# Patient Record
Sex: FEMALE | URBAN - METROPOLITAN AREA
[De-identification: names, ages, dates, MRNs, and addresses within clinical notes are randomized per-mention and may not be internally consistent; named-entity substitution may affect disease eponyms.]

---

## 2017-01-26 ENCOUNTER — OUTPATIENT (OUTPATIENT)
Dept: OUTPATIENT SERVICES | Facility: HOSPITAL | Age: 52
LOS: 1 days | End: 2017-01-26
Payer: MEDICARE

## 2017-01-26 PROCEDURE — 71046 X-RAY EXAM CHEST 2 VIEWS: CPT

## 2017-01-26 PROCEDURE — 71020: CPT | Mod: 26

## 2018-03-30 PROBLEM — Z00.00 ENCOUNTER FOR PREVENTIVE HEALTH EXAMINATION: Status: ACTIVE | Noted: 2018-03-30

## 2018-04-05 ENCOUNTER — APPOINTMENT (OUTPATIENT)
Dept: NEUROLOGY | Facility: CLINIC | Age: 53
End: 2018-04-05
Payer: MEDICARE

## 2018-04-05 PROCEDURE — 95816 EEG AWAKE AND DROWSY: CPT

## 2021-03-02 ENCOUNTER — NON-APPOINTMENT (OUTPATIENT)
Age: 56
End: 2021-03-02

## 2021-03-30 ENCOUNTER — NON-APPOINTMENT (OUTPATIENT)
Age: 56
End: 2021-03-30

## 2021-04-19 ENCOUNTER — APPOINTMENT (OUTPATIENT)
Dept: BREAST CENTER | Facility: CLINIC | Age: 56
End: 2021-04-19
Payer: MEDICARE

## 2021-04-19 VITALS — OXYGEN SATURATION: 98 % | BODY MASS INDEX: 27.31 KG/M2 | WEIGHT: 160 LBS | HEIGHT: 64 IN | HEART RATE: 64 BPM

## 2021-04-19 DIAGNOSIS — Z80.0 FAMILY HISTORY OF MALIGNANT NEOPLASM OF DIGESTIVE ORGANS: ICD-10-CM

## 2021-04-19 DIAGNOSIS — Z78.9 OTHER SPECIFIED HEALTH STATUS: ICD-10-CM

## 2021-04-19 PROCEDURE — 99203 OFFICE O/P NEW LOW 30 MIN: CPT

## 2021-04-19 RX ORDER — BREXPIPRAZOLE 4 MG/1
4 TABLET ORAL
Qty: 30 | Refills: 0 | Status: ACTIVE | COMMUNITY
Start: 2021-04-05

## 2021-04-19 RX ORDER — LAMOTRIGINE 200 MG/1
200 TABLET ORAL
Qty: 60 | Refills: 0 | Status: ACTIVE | COMMUNITY
Start: 2021-03-20

## 2021-04-19 RX ORDER — HYDROXYZINE HYDROCHLORIDE 25 MG/1
25 TABLET ORAL
Qty: 30 | Refills: 0 | Status: ACTIVE | COMMUNITY
Start: 2021-03-20

## 2021-04-19 RX ORDER — ATORVASTATIN CALCIUM 10 MG/1
10 TABLET, FILM COATED ORAL
Qty: 30 | Refills: 0 | Status: ACTIVE | COMMUNITY
Start: 2021-03-02

## 2021-04-19 RX ORDER — MIRTAZAPINE 15 MG/1
15 TABLET, FILM COATED ORAL
Qty: 30 | Refills: 0 | Status: ACTIVE | COMMUNITY
Start: 2021-04-05

## 2021-04-19 RX ORDER — FAMOTIDINE 20 MG/1
20 TABLET, FILM COATED ORAL
Qty: 60 | Refills: 0 | Status: ACTIVE | COMMUNITY
Start: 2021-03-23

## 2021-04-19 RX ORDER — VENLAFAXINE HYDROCHLORIDE 150 MG/1
150 CAPSULE, EXTENDED RELEASE ORAL
Qty: 60 | Refills: 0 | Status: ACTIVE | COMMUNITY
Start: 2021-03-20

## 2021-04-19 RX ORDER — MIDODRINE HYDROCHLORIDE 5 MG/1
5 TABLET ORAL
Qty: 20 | Refills: 0 | Status: ACTIVE | COMMUNITY
Start: 2021-01-08

## 2021-04-19 RX ORDER — CLONAZEPAM 2 MG/1
2 TABLET ORAL
Qty: 60 | Refills: 0 | Status: ACTIVE | COMMUNITY
Start: 2021-03-30

## 2021-04-19 NOTE — PAST MEDICAL HISTORY
[Postmenopausal] : The patient is postmenopausal [Menarche Age ____] : age at menarche was [unfilled] [Menopause Age____] : age at menopause was [unfilled] [Approximately ___] : the LMP was approximately [unfilled] [Total Preg ___] : G[unfilled] [AB Spont ___] : miscarriages: [unfilled]  [History of Hormone Replacement Treatment] : has no history of hormone replacement treatment [FreeTextEntry5] : None [FreeTextEntry6] : None [FreeTextEntry7] : Yes for treatment of endometriosis when she was 18 [FreeTextEntry8] : None

## 2021-04-19 NOTE — HISTORY OF PRESENT ILLNESS
[FreeTextEntry1] : Cristal is a 56 year old female referred by Dr. De La Rosa (Breast Surgeon) who presents for initial evaluation regarding family history of breast cancer.  She has a hx of b/l benign breast excisions, one in her left breast over 20 years ago and one in her right breast in 2017; reports that her breast surgeon retired. Denies any breast pain or palpable breasts abnormalities today, denies nipple discharge. \par \par PURNIMA lifetime risk is 29.9%; Family hx of breast cancer: mother - 49 years old, m-aunt 52 years old, maternal female cousin - 34 years old\par \par Discussed patients high risk status and recommendations for close surveillance. Patient understands and would like to be followed closely.\par \par Reviewed the benefits of genetic testing in the setting of the patients family history, as well the implications of the various results; insurance coverage and pricing was also explained.\par \par Discussed benefits of surveillance and well as implication of the sensitivity of breast MRI. Patient understands and agrees to go forward with MRI for breast cancer surveillance.\par

## 2021-04-19 NOTE — DATA REVIEWED
[FreeTextEntry1] : 3/1/2021 ([facility]) bilateral mammogram/ultrasound showing heterogeneously dense tissue, no mammographic or sonographic evidence of malignancy in either breast. Benign finding BIRADS 2

## 2021-04-30 ENCOUNTER — NON-APPOINTMENT (OUTPATIENT)
Age: 56
End: 2021-04-30

## 2021-06-08 ENCOUNTER — NON-APPOINTMENT (OUTPATIENT)
Age: 56
End: 2021-06-08

## 2021-08-26 ENCOUNTER — NON-APPOINTMENT (OUTPATIENT)
Age: 56
End: 2021-08-26

## 2021-09-03 ENCOUNTER — APPOINTMENT (OUTPATIENT)
Dept: BREAST CENTER | Facility: CLINIC | Age: 56
End: 2021-09-03
Payer: MEDICARE

## 2021-09-03 VITALS — OXYGEN SATURATION: 98 % | WEIGHT: 160 LBS | HEIGHT: 64 IN | BODY MASS INDEX: 27.31 KG/M2 | HEART RATE: 77 BPM

## 2021-09-03 DIAGNOSIS — N64.59 OTHER SIGNS AND SYMPTOMS IN BREAST: ICD-10-CM

## 2021-09-03 DIAGNOSIS — Z86.73 PERSONAL HISTORY OF TRANSIENT ISCHEMIC ATTACK (TIA), AND CEREBRAL INFARCTION W/OUT RESIDUAL DEFICITS: ICD-10-CM

## 2021-09-03 PROCEDURE — 99213 OFFICE O/P EST LOW 20 MIN: CPT

## 2021-09-03 NOTE — PHYSICAL EXAM
[Normocephalic] : normocephalic [Atraumatic] : atraumatic [Supple] : supple [No Supraclavicular Adenopathy] : no supraclavicular adenopathy [Examined in the supine and seated position] : examined in the supine and seated position [No dominant masses] : no dominant masses left breast [No Nipple Retraction] : no right nipple retraction [No Nipple Discharge] : no left nipple discharge [No Axillary Lymphadenopathy] : no left axillary lymphadenopathy [No Edema] : no edema [No Rashes] : no rashes [No Ulceration] : no ulceration [Symmetrical] : symmetrical [Breast Nipple Inversion] : nipples not inverted [Breast Nipple Flattening] : nipples not flattened [Breast Nipple Retraction] : nipples not retracted [Breast Nipple Fissures] : nipples not fissured [de-identified] : palpable soft area of density about 2cm, at 2n5 [de-identified] : bilateral incisions are well healed

## 2021-09-03 NOTE — ASSESSMENT
[FreeTextEntry1] : 55 yo female presents for high risk screening w/ new complaint of pain and palpable lump in her right axilla; i advised her to use ITC Tylenol or ibuprofen for the pain and will set her up for a targeted US of that area, as well as to an area of palpable density in the right breast as described above (likely benign). If benign she will proceed with screening imaging in March & RTC after. The patient verbalized understanding and is in agreement with the plan.

## 2021-09-03 NOTE — HISTORY OF PRESENT ILLNESS
[FreeTextEntry1] : Cristal is a 56 year old female who presents for follow-up high risk screening. Today she c/o palpable right axillary lump and pain that she first noticed 2 weeks ago. She denies fevers, chills, recent injury, infection, or changes in activity. Her COVID-19 vaccinations were in April 2021. \par \par She has a hx of b/l benign breast excisions, one in her left breast over 20 years ago and one in her right breast in 2017. \par \par PURNIMA lifetime risk is 29.9%; Family hx of breast cancer: mother - 49 years old, m-aunt 52 years old, maternal female cousin - 34 years old. Genetic testing in April 2021 revealed a VUS in NBN and PDGFRA.

## 2021-09-03 NOTE — REVIEW OF SYSTEMS
[As Noted in HPI] : as noted in HPI [Fever] : no fever [Chills] : no chills [Breast Pain] : breast pain [Breast Lump] : breast lump [Negative] : Musculoskeletal [de-identified] : axillary lump and pain

## 2021-09-03 NOTE — DATA REVIEWED
[FreeTextEntry1] : 3/1/2021 ([facility]) bilateral mammogram/ultrasound showing heterogeneously dense tissue, no mammographic or sonographic evidence of malignancy in either breast. Benign finding BIRADS 2 \par \par 4/2021 ([facility]) Common Hereditary Cancers Panel: VUS in NBN and PDGFRA. \par \par 6/3/21 ([facility]) MRI breasts: Negative. BI-RADS 1.

## 2021-10-19 ENCOUNTER — APPOINTMENT (OUTPATIENT)
Dept: RHEUMATOLOGY | Facility: CLINIC | Age: 56
End: 2021-10-19
Payer: MEDICARE

## 2021-10-19 ENCOUNTER — NON-APPOINTMENT (OUTPATIENT)
Age: 56
End: 2021-10-19

## 2021-10-19 VITALS
HEART RATE: 69 BPM | HEIGHT: 64 IN | OXYGEN SATURATION: 98 % | WEIGHT: 168 LBS | TEMPERATURE: 97.8 F | BODY MASS INDEX: 28.68 KG/M2 | SYSTOLIC BLOOD PRESSURE: 114 MMHG | DIASTOLIC BLOOD PRESSURE: 79 MMHG

## 2021-10-19 DIAGNOSIS — R76.8 OTHER SPECIFIED ABNORMAL IMMUNOLOGICAL FINDINGS IN SERUM: ICD-10-CM

## 2021-10-19 PROCEDURE — 99203 OFFICE O/P NEW LOW 30 MIN: CPT

## 2021-10-20 PROBLEM — R76.8 ANA POSITIVE: Status: ACTIVE | Noted: 2021-10-20

## 2021-10-20 NOTE — ASSESSMENT
[FreeTextEntry1] : 56 year old woman referred by cardiologist for evaluation of positive CRISTO.  Patient reports CRISTO positive in past as well, about five years ago.  Evaluation at that time, diagnosed with mild SLE, no medications prescribed at that time, with follow up monitoring. At this time, patient with positive CRISTO with negative serologies, not meeting criteria for an underlying connective tissue disease such as SLE. Will continue to monitor and patient will follow up if develops any new signs or symptoms suggestive of connective tissue disease such as SLE.  Patient awaiting neurology follow up as well.

## 2021-10-20 NOTE — PHYSICAL EXAM
[General Appearance - Alert] : alert [General Appearance - In No Acute Distress] : in no acute distress [General Appearance - Well-Appearing] : healthy appearing [Sclera] : the sclera and conjunctiva were normal [Examination Of The Oral Cavity] : the lips and gums were normal [Oropharynx] : the oropharynx was normal [Respiration, Rhythm And Depth] : normal respiratory rhythm and effort [Exaggerated Use Of Accessory Muscles For Inspiration] : no accessory muscle use [Musculoskeletal - Swelling] : no joint swelling seen [] : no rash [Skin Lesions] : no skin lesions [Oriented To Time, Place, And Person] : oriented to person, place, and time [Impaired Insight] : insight and judgment were intact [Affect] : the affect was normal [FreeTextEntry1] : No active synovitis of the upper and lower extremities bilaterally.

## 2022-02-22 ENCOUNTER — APPOINTMENT (OUTPATIENT)
Dept: NEPHROLOGY | Facility: CLINIC | Age: 57
End: 2022-02-22

## 2022-05-11 ENCOUNTER — NON-APPOINTMENT (OUTPATIENT)
Age: 57
End: 2022-05-11

## 2022-07-05 ENCOUNTER — APPOINTMENT (OUTPATIENT)
Dept: MAMMOGRAPHY | Facility: CLINIC | Age: 57
End: 2022-07-05

## 2022-07-05 ENCOUNTER — RESULT REVIEW (OUTPATIENT)
Age: 57
End: 2022-07-05

## 2022-07-05 ENCOUNTER — APPOINTMENT (OUTPATIENT)
Dept: ULTRASOUND IMAGING | Facility: CLINIC | Age: 57
End: 2022-07-05

## 2022-07-05 PROCEDURE — 77067 SCR MAMMO BI INCL CAD: CPT

## 2022-07-05 PROCEDURE — 77063 BREAST TOMOSYNTHESIS BI: CPT

## 2022-07-05 PROCEDURE — 76641 ULTRASOUND BREAST COMPLETE: CPT | Mod: 50

## 2022-07-15 ENCOUNTER — APPOINTMENT (OUTPATIENT)
Dept: BREAST CENTER | Facility: CLINIC | Age: 57
End: 2022-07-15

## 2022-07-15 VITALS — HEIGHT: 60 IN | HEART RATE: 83 BPM | OXYGEN SATURATION: 96 % | WEIGHT: 170 LBS | BODY MASS INDEX: 33.38 KG/M2

## 2022-07-15 PROCEDURE — 99213 OFFICE O/P EST LOW 20 MIN: CPT

## 2022-07-15 NOTE — HISTORY OF PRESENT ILLNESS
[FreeTextEntry1] : Cristal is a 57 year old female who presents for follow-up high risk screening. Pt denies palpable masses, skin lesions/changes, nipple discharge, or other breast complaints.\par \par She has a hx of b/l benign breast excisions, one in her left breast over 20 years ago and one in her right breast in 2017.\par \par PURNIMA lifetime risk is 29.9%; Family hx of breast cancer: mother - 49 years old, m-aunt 52 years old, maternal female cousin - 34 years old. Genetic testing in April 2021 revealed a VUS in NBN and PDGFRA.

## 2022-07-15 NOTE — REVIEW OF SYSTEMS
[As Noted in HPI] : as noted in HPI [Negative] : Endocrine [Fever] : no fever [Chills] : no chills [Breast Pain] : breast pain [Breast Lump] : no breast lump [de-identified] : one time occurrence of left breast pain; resolved on it's own

## 2022-07-15 NOTE — ASSESSMENT
[FreeTextEntry1] : 58 yo female presents for high risk screening. Discussed with the patient the implications of their lifetime risk, which is considered to be elevated for the development of breast cancer over the span of their lifetime. It was explained that it is recommended that they undergo high risk screening surveillance to include biannual radiological screening exams with a mammogram and screening MRI. Reviewed that her recent imaging was benign; will have her proceed with the high risk screening MRI in December/January, to provide optimal year round screening. The patient verbalized understanding and is in agreement wit the plan.

## 2022-07-15 NOTE — PHYSICAL EXAM
[Normocephalic] : normocephalic [Atraumatic] : atraumatic [Supple] : supple [No Supraclavicular Adenopathy] : no supraclavicular adenopathy [Examined in the supine and seated position] : examined in the supine and seated position [Symmetrical] : symmetrical [No dominant masses] : no dominant masses left breast [No Nipple Retraction] : no left nipple retraction [No Nipple Discharge] : no left nipple discharge [No Axillary Lymphadenopathy] : no left axillary lymphadenopathy [No Edema] : no edema [No Rashes] : no rashes [No Ulceration] : no ulceration [Breast Nipple Inversion] : nipples not inverted [Breast Nipple Retraction] : nipples not retracted [Breast Nipple Flattening] : nipples not flattened [Breast Nipple Fissures] : nipples not fissured [de-identified] : palpable soft area of density about 2cm, at 2n5 [de-identified] : bilateral incisions are well healed

## 2022-09-08 ENCOUNTER — APPOINTMENT (OUTPATIENT)
Dept: MRI IMAGING | Facility: CLINIC | Age: 57
End: 2022-09-08

## 2022-09-08 PROCEDURE — A9585: CPT | Mod: JW

## 2022-09-08 PROCEDURE — 77049 MRI BREAST C-+ W/CAD BI: CPT

## 2022-09-13 ENCOUNTER — NON-APPOINTMENT (OUTPATIENT)
Age: 57
End: 2022-09-13

## 2022-10-31 ENCOUNTER — APPOINTMENT (OUTPATIENT)
Dept: CT IMAGING | Facility: CLINIC | Age: 57
End: 2022-10-31

## 2022-11-18 ENCOUNTER — APPOINTMENT (OUTPATIENT)
Dept: BREAST CENTER | Facility: CLINIC | Age: 57
End: 2022-11-18

## 2022-11-18 DIAGNOSIS — R07.9 CHEST PAIN, UNSPECIFIED: ICD-10-CM

## 2022-11-18 PROCEDURE — 99442: CPT

## 2022-11-21 PROBLEM — R07.9 CHEST PAIN: Status: ACTIVE | Noted: 2022-11-21

## 2022-11-21 NOTE — ASSESSMENT
[FreeTextEntry1] : 56 yo female presents with reports of acute onset right breast pain; Discussed with the patient that mastalgia is not a common sign of breast cancer. I reviewed keeping a symptom calendar, the use of OTC Ibuprofen, warm/cold compresses, stretching,, wearing a sports bra.\par \par I explained to her that she pain she is describing sounds most likely like it could be cardiac in nature; I advised her to call her cardiologist ASAP to report the symptoms. If she develops numbness, tingling, worsening symptoms including chest pain, difficulty breathing, difficulty moving limbs, difficulty speaking, I advised her to go to the ER. Patient understands, will call her cardiologist office today.

## 2022-11-21 NOTE — REVIEW OF SYSTEMS
[Breast Pain] : breast pain [Negative] : Endocrine [Fever] : no fever [Chills] : no chills [Chest Pain] : chest pain [Breast Lump] : no breast lump [As Noted in HPI] : as noted in HPI [Limb Weakness] : limb weakness

## 2022-11-21 NOTE — REASON FOR VISIT
[Follow-Up: _____] : a [unfilled] follow-up visit [Home] : at home, [unfilled] , at the time of the visit. [Medical Office: ([facility])___] : at the medical office located in  [Verbal consent obtained from patient] : the patient, [unfilled] [FreeTextEntry1] : right breast pain

## 2022-11-21 NOTE — HISTORY OF PRESENT ILLNESS
[FreeTextEntry1] : Cristal is a 57 year old female who presents for new complaints of right breast pain; patient states that last week she was in the shower had had an sudden onset severe 10/10 breast pain that radiated to her back, was unable to raise her hand. Occurred again later that night, also radiated to her back and reports that it causes difficulty breathing. The pain lasted for about 5 minutes each time and then resolved. She states that she checked her blood pressure, and "it was fine". Patient states that the same thing happened again last night. Denies numbness, tingling in her arms or legs, no difficulty walking, denies KEY. The patient has a significant cardiac history (MVP) and has an appointment with a pulmonologist in December (was recommended by her PCP due to reported wheezing, coughing, unable to identify cause). She was told in the past she has mild SLE, had a +CRISTO. Pt denies palpable masses, skin lesions/changes, or nipple discharge.\par \par She has a hx of b/l benign breast excisions, one in her left breast over 20 years ago and one in her right breast in 2017.\par \par PURNIMA lifetime risk is 29.9%; Family hx of breast cancer: mother - 49 years old, m-aunt 52 years old, maternal female cousin - 34 years old. Genetic testing in April 2021 revealed a VUS in NBN and PDGFRA.

## 2022-11-21 NOTE — DATA REVIEWED
[FreeTextEntry1] : 3/1/2021 ([facility]) bilateral mammogram/ultrasound showing heterogeneously dense tissue, no mammographic or sonographic evidence of malignancy in either breast. Benign finding BIRADS 2 \par \par 4/2021 ([facility]) Common Hereditary Cancers Panel: VUS in NBN and PDGFRA. \par \par 6/3/21 ([facility]) MRI breasts: Negative. BI-RADS 1. \par \par 11/11/21 ([facility]) Right US: No suspicious lesion in the right breast. \par \par 7/5/22 ([facility]): b/k mmg & US: heterogenously dense. No mammographic or sonographic evidence of malignancy. No significant interval changes in comparison to previous mammographic and sonographic studies. BI-RADS 2. \par \par 9/2/22 ([facility]) MRI breasts: No MRI evidence of malignancy. No significant interval changes in comparison to previous. RECOMMENDATION:  Mammography and ultrasound in 6 months. BI-RADS 2.

## 2023-01-31 ENCOUNTER — APPOINTMENT (OUTPATIENT)
Dept: ULTRASOUND IMAGING | Facility: CLINIC | Age: 58
End: 2023-01-31
Payer: MEDICARE

## 2023-01-31 PROCEDURE — 76700 US EXAM ABDOM COMPLETE: CPT

## 2023-04-18 ENCOUNTER — APPOINTMENT (OUTPATIENT)
Dept: BREAST CENTER | Facility: CLINIC | Age: 58
End: 2023-04-18
Payer: MEDICARE

## 2023-04-18 VITALS
BODY MASS INDEX: 34.36 KG/M2 | WEIGHT: 175 LBS | HEIGHT: 60 IN | OXYGEN SATURATION: 98 % | DIASTOLIC BLOOD PRESSURE: 74 MMHG | SYSTOLIC BLOOD PRESSURE: 98 MMHG | HEART RATE: 77 BPM

## 2023-04-18 DIAGNOSIS — Z80.0 FAMILY HISTORY OF MALIGNANT NEOPLASM OF DIGESTIVE ORGANS: ICD-10-CM

## 2023-04-18 PROCEDURE — 99213 OFFICE O/P EST LOW 20 MIN: CPT

## 2023-04-18 NOTE — ASSESSMENT
[FreeTextEntry1] : 57 yo female presents for high risk screening and ongoing breast pain. Discussed with the patient the implications of their lifetime risk, which is considered to be elevated for the development of breast cancer over the span of their lifetime. It was explained that it is recommended that they undergo high risk screening surveillance to include biannual radiological screening exams with a mammogram and screening MRI. She will be due for her annual mammo/US in July, if that is benign will push out the MRI to November for more optimal year round screening. \par \par Discussed etiologies of breast pain including hormonal changes, benign entities such as cysts, reactive lymph nodes, musculoskeletal issues and rarely malignancy. Recommended keeping a diary of when the breast pain comes on to narrow down the cause. Reviewed the use of OTC Ibuprofen, warm/cold compresses, stretching,wearing a sports bra, Salon Pas patches.

## 2023-04-18 NOTE — PHYSICAL EXAM
[Normocephalic] : normocephalic [No Supraclavicular Adenopathy] : no supraclavicular adenopathy [Examined in the supine and seated position] : examined in the supine and seated position [Symmetrical] : symmetrical [No dominant masses] : no dominant masses in right breast  [No dominant masses] : no dominant masses left breast [No Nipple Retraction] : no left nipple retraction [No Nipple Discharge] : no left nipple discharge [Breast Nipple Inversion] : nipples not inverted [Breast Nipple Retraction] : nipples not retracted [Breast Nipple Flattening] : nipples not flattened [Breast Nipple Fissures] : nipples not fissured [No Axillary Lymphadenopathy] : no left axillary lymphadenopathy [No Edema] : no edema [No Rashes] : no rashes [No Ulceration] : no ulceration [de-identified] : bilateral surgical incisions well healed, mild tissue thickening at the left breast surgical site, no dominant masses

## 2023-04-18 NOTE — REVIEW OF SYSTEMS
[Chest Pain] : chest pain [Breast Pain] : breast pain [As Noted in HPI] : as noted in HPI [Limb Weakness] : limb weakness [Fever] : no fever [Chills] : no chills [Arthralgias] : arthralgias [Breast Lump] : no breast lump [Negative] : Heme/Lymph [de-identified] : SLE

## 2023-07-21 ENCOUNTER — APPOINTMENT (OUTPATIENT)
Dept: MAMMOGRAPHY | Facility: CLINIC | Age: 58
End: 2023-07-21

## 2023-07-21 ENCOUNTER — APPOINTMENT (OUTPATIENT)
Dept: ULTRASOUND IMAGING | Facility: CLINIC | Age: 58
End: 2023-07-21

## 2023-08-29 ENCOUNTER — APPOINTMENT (OUTPATIENT)
Dept: MAMMOGRAPHY | Facility: CLINIC | Age: 58
End: 2023-08-29
Payer: MEDICARE

## 2023-08-29 ENCOUNTER — APPOINTMENT (OUTPATIENT)
Dept: ULTRASOUND IMAGING | Facility: CLINIC | Age: 58
End: 2023-08-29

## 2023-08-29 ENCOUNTER — RESULT REVIEW (OUTPATIENT)
Age: 58
End: 2023-08-29

## 2023-08-29 PROCEDURE — 76641 ULTRASOUND BREAST COMPLETE: CPT | Mod: 50,GY

## 2023-08-29 PROCEDURE — 77067 SCR MAMMO BI INCL CAD: CPT

## 2023-08-29 PROCEDURE — 77063 BREAST TOMOSYNTHESIS BI: CPT

## 2023-11-17 ENCOUNTER — NON-APPOINTMENT (OUTPATIENT)
Age: 58
End: 2023-11-17

## 2023-11-21 ENCOUNTER — APPOINTMENT (OUTPATIENT)
Dept: BREAST CENTER | Facility: CLINIC | Age: 58
End: 2023-11-21

## 2023-12-04 ENCOUNTER — APPOINTMENT (OUTPATIENT)
Dept: MRI IMAGING | Facility: CLINIC | Age: 58
End: 2023-12-04

## 2024-04-08 ENCOUNTER — NON-APPOINTMENT (OUTPATIENT)
Age: 59
End: 2024-04-08

## 2024-04-26 ENCOUNTER — APPOINTMENT (OUTPATIENT)
Dept: BREAST CENTER | Facility: CLINIC | Age: 59
End: 2024-04-26
Payer: MEDICARE

## 2024-04-26 VITALS
HEIGHT: 60 IN | SYSTOLIC BLOOD PRESSURE: 111 MMHG | BODY MASS INDEX: 34.75 KG/M2 | WEIGHT: 177 LBS | DIASTOLIC BLOOD PRESSURE: 71 MMHG | HEART RATE: 69 BPM

## 2024-04-26 DIAGNOSIS — R92.30 DENSE BREASTS, UNSPECIFIED: ICD-10-CM

## 2024-04-26 DIAGNOSIS — K74.00 HEPATIC FIBROSIS, UNSPECIFIED: ICD-10-CM

## 2024-04-26 DIAGNOSIS — Z91.89 OTHER SPECIFIED PERSONAL RISK FACTORS, NOT ELSEWHERE CLASSIFIED: ICD-10-CM

## 2024-04-26 DIAGNOSIS — K86.2 CYST OF PANCREAS: ICD-10-CM

## 2024-04-26 DIAGNOSIS — Z80.3 FAMILY HISTORY OF MALIGNANT NEOPLASM OF BREAST: ICD-10-CM

## 2024-04-26 DIAGNOSIS — N64.4 MASTODYNIA: ICD-10-CM

## 2024-04-26 DIAGNOSIS — K82.4 CHOLESTEROLOSIS OF GALLBLADDER: ICD-10-CM

## 2024-04-26 PROCEDURE — 99213 OFFICE O/P EST LOW 20 MIN: CPT

## 2024-04-26 RX ORDER — METOPROLOL SUCCINATE 25 MG/1
25 TABLET, EXTENDED RELEASE ORAL
Refills: 0 | Status: ACTIVE | COMMUNITY

## 2024-04-26 RX ORDER — MIDODRINE HYDROCHLORIDE 2.5 MG/1
2.5 TABLET ORAL
Qty: 180 | Refills: 0 | Status: DISCONTINUED | COMMUNITY
Start: 2021-04-09 | End: 2024-04-26

## 2024-04-26 RX ORDER — VENLAFAXINE HYDROCHLORIDE 75 MG/1
75 CAPSULE, EXTENDED RELEASE ORAL
Qty: 30 | Refills: 0 | Status: DISCONTINUED | COMMUNITY
Start: 2021-03-31 | End: 2024-04-26

## 2024-04-26 RX ORDER — ASPIRIN 81 MG/1
81 TABLET ORAL
Refills: 0 | Status: ACTIVE | COMMUNITY

## 2024-04-26 RX ORDER — FLUTICASONE PROPION/SALMETEROL 100-50 MCG
100-50 BLISTER, WITH INHALATION DEVICE INHALATION
Refills: 0 | Status: ACTIVE | COMMUNITY

## 2024-04-26 NOTE — DATA REVIEWED
[FreeTextEntry1] : 3/1/2021 ([facility]) bilateral mammogram/ultrasound showing heterogeneously dense tissue, no mammographic or sonographic evidence of malignancy in either breast. Benign finding BIRADS 2   4/2021 ([facility]) Common Hereditary Cancers Panel: VUS in NBN and PDGFRA.   6/3/21 ([facility]) MRI breasts: Negative. BI-RADS 1.   11/11/21 ([facility]) Right US: No suspicious lesion in the right breast.   7/5/22 ([facility]): b/k mmg & US: heterogenously dense. No mammographic or sonographic evidence of malignancy. No significant interval changes in comparison to previous mammographic and sonographic studies. BI-RADS 2.   9/2/22 ([facility]) MRI breasts: No MRI evidence of malignancy. No significant interval changes in comparison to previous. RECOMMENDATION:  Mammography and ultrasound in 6 months. BI-RADS 2.  8/29/23 ([facility]) b/l mammo and US:  heterogeneously dense. No mammographic or sonographic evidence of malignancy. BI-RADS 2.

## 2024-04-26 NOTE — REVIEW OF SYSTEMS
[Chest Pain] : chest pain [Arthralgias] : arthralgias [Breast Pain] : breast pain [As Noted in HPI] : as noted in HPI [Limb Weakness] : limb weakness [Negative] : Heme/Lymph [Fever] : no fever [Chills] : no chills [Breast Lump] : no breast lump [de-identified] : SLE

## 2024-04-26 NOTE — HISTORY OF PRESENT ILLNESS
[FreeTextEntry1] : Cristal is a 59 year old female who presents for ongoing bilateral breast pain as well as for high risk screening. The patient underwent cardiac workup which was negative; states that she was recent definitely diagnosed with SLE. The pain is still present, minimally improved with sports bras. The pain is intermittent, sometimes unilateral sometimes bilateral, sometimes tender to touch. She states that a few weeks ago her right nipple became darker than previous, states that it resolved on it's own and is back to baseline (the patient reported this at her last visit as well). Pt denies palpable masses, skin lesions/changes, nipple discharge.  She has a hx of b/l benign breast excisions, one in her left breast over 20 years ago and one in her right breast in 2017.  PURNIMA lifetime risk is 29.9%; Family hx of breast cancer: mother - 49 years old, m-aunt 52 years old, maternal female cousin - 34 years old. Genetic testing in April 2021 revealed a VUS in NBN and PDGFRA.

## 2024-04-26 NOTE — PLAN
18-Dec-2017 06:34 [TextEntry] : MRI now [facility] If benign, mammo/US in September RTC in 6 months

## 2024-04-26 NOTE — PHYSICAL EXAM
[Normocephalic] : normocephalic [No Supraclavicular Adenopathy] : no supraclavicular adenopathy [Examined in the supine and seated position] : examined in the supine and seated position [Symmetrical] : symmetrical [No dominant masses] : no dominant masses in right breast  [No dominant masses] : no dominant masses left breast [No Nipple Retraction] : no left nipple retraction [No Nipple Discharge] : no left nipple discharge [No Axillary Lymphadenopathy] : no left axillary lymphadenopathy [No Edema] : no edema [No Rashes] : no rashes [No Ulceration] : no ulceration [Breast Nipple Inversion] : nipples not inverted [Breast Nipple Retraction] : nipples not retracted [Breast Nipple Flattening] : nipples not flattened [Breast Nipple Fissures] : nipples not fissured [de-identified] : bilateral surgical incisions well healed, mild tissue thickening at the left breast surgical site, no dominant masses

## 2024-05-07 ENCOUNTER — APPOINTMENT (OUTPATIENT)
Dept: MRI IMAGING | Facility: CLINIC | Age: 59
End: 2024-05-07
Payer: MEDICARE

## 2024-05-07 PROCEDURE — 77049 MRI BREAST C-+ W/CAD BI: CPT

## 2024-05-07 PROCEDURE — A9585: CPT

## 2024-05-13 ENCOUNTER — NON-APPOINTMENT (OUTPATIENT)
Age: 59
End: 2024-05-13

## 2024-07-09 ENCOUNTER — APPOINTMENT (OUTPATIENT)
Dept: OBGYN | Facility: CLINIC | Age: 59
End: 2024-07-09

## 2024-09-12 ENCOUNTER — RESULT REVIEW (OUTPATIENT)
Age: 59
End: 2024-09-12

## 2024-09-12 ENCOUNTER — APPOINTMENT (OUTPATIENT)
Dept: ULTRASOUND IMAGING | Facility: CLINIC | Age: 59
End: 2024-09-12
Payer: MEDICARE

## 2024-09-12 ENCOUNTER — APPOINTMENT (OUTPATIENT)
Dept: MAMMOGRAPHY | Facility: CLINIC | Age: 59
End: 2024-09-12
Payer: MEDICARE

## 2024-09-12 PROCEDURE — 76641 ULTRASOUND BREAST COMPLETE: CPT | Mod: 50,GY

## 2024-09-12 PROCEDURE — 77067 SCR MAMMO BI INCL CAD: CPT

## 2024-09-12 PROCEDURE — 77063 BREAST TOMOSYNTHESIS BI: CPT

## 2024-11-01 ENCOUNTER — APPOINTMENT (OUTPATIENT)
Dept: OBGYN | Facility: CLINIC | Age: 59
End: 2024-11-01

## 2024-11-04 ENCOUNTER — APPOINTMENT (OUTPATIENT)
Dept: OBGYN | Facility: CLINIC | Age: 59
End: 2024-11-04

## 2024-11-08 ENCOUNTER — APPOINTMENT (OUTPATIENT)
Dept: BREAST CENTER | Facility: CLINIC | Age: 59
End: 2024-11-08

## 2024-11-19 ENCOUNTER — APPOINTMENT (OUTPATIENT)
Dept: BREAST CENTER | Facility: CLINIC | Age: 59
End: 2024-11-19

## 2025-06-06 ENCOUNTER — APPOINTMENT (OUTPATIENT)
Dept: BREAST CENTER | Facility: CLINIC | Age: 60
End: 2025-06-06
Payer: MEDICARE

## 2025-06-06 VITALS — BODY MASS INDEX: 29.53 KG/M2 | HEIGHT: 64 IN | WEIGHT: 173 LBS

## 2025-06-06 PROCEDURE — 99213 OFFICE O/P EST LOW 20 MIN: CPT

## 2025-06-06 RX ORDER — BENZONATATE 100 MG/1
100 CAPSULE ORAL
Refills: 0 | Status: ACTIVE | COMMUNITY

## 2025-06-06 RX ORDER — VENLAFAXINE HYDROCHLORIDE 150 MG/1
150 CAPSULE, EXTENDED RELEASE ORAL
Refills: 0 | Status: ACTIVE | COMMUNITY

## 2025-06-06 RX ORDER — GUAIFENESIN AND DEXTROMETHORPHAN 10; 100 MG/5ML; MG/5ML
10-100 SYRUP ORAL
Refills: 0 | Status: ACTIVE | COMMUNITY

## 2025-06-06 RX ORDER — OMEPRAZOLE 20 MG/1
20 TABLET, DELAYED RELEASE ORAL
Refills: 0 | Status: ACTIVE | COMMUNITY

## 2025-06-06 RX ORDER — SIMETHICONE 125 MG/1
125 TABLET, CHEWABLE ORAL
Refills: 0 | Status: ACTIVE | COMMUNITY

## 2025-06-06 RX ORDER — LINACLOTIDE 72 UG/1
72 CAPSULE, GELATIN COATED ORAL
Refills: 0 | Status: ACTIVE | COMMUNITY

## 2025-06-06 RX ORDER — FLUTICASONE PROPIONATE 50 MCG
50 SPRAY, SUSPENSION NASAL
Refills: 0 | Status: ACTIVE | COMMUNITY

## 2025-08-04 ENCOUNTER — APPOINTMENT (OUTPATIENT)
Dept: MRI IMAGING | Facility: CLINIC | Age: 60
End: 2025-08-04
Payer: MEDICARE

## 2025-08-04 PROCEDURE — A9585: CPT | Mod: JW

## 2025-08-04 PROCEDURE — 77049 MRI BREAST C-+ W/CAD BI: CPT

## 2025-09-15 ENCOUNTER — APPOINTMENT (OUTPATIENT)
Dept: ULTRASOUND IMAGING | Facility: CLINIC | Age: 60
End: 2025-09-15
Payer: MEDICARE

## 2025-09-15 ENCOUNTER — RESULT REVIEW (OUTPATIENT)
Age: 60
End: 2025-09-15

## 2025-09-15 ENCOUNTER — APPOINTMENT (OUTPATIENT)
Dept: MAMMOGRAPHY | Facility: CLINIC | Age: 60
End: 2025-09-15
Payer: MEDICARE

## 2025-09-15 PROCEDURE — 76641 ULTRASOUND BREAST COMPLETE: CPT | Mod: 50,GA

## 2025-09-15 PROCEDURE — 77067 SCR MAMMO BI INCL CAD: CPT

## 2025-09-15 PROCEDURE — 77063 BREAST TOMOSYNTHESIS BI: CPT
